# Patient Record
Sex: FEMALE | Race: OTHER | Employment: OTHER | URBAN - METROPOLITAN AREA
[De-identification: names, ages, dates, MRNs, and addresses within clinical notes are randomized per-mention and may not be internally consistent; named-entity substitution may affect disease eponyms.]

---

## 2019-07-09 NOTE — PATIENT DISCUSSION
The patient has a history of recurrent corneal erosion. This is a spontaneous corneal abrasion that recurrs due to previous corneal injury or an hereditary corneal epithelial dystrophy. It manifests itself by a sudden corneal defect, usually upon awakening and typically may heal during the day. I have recommended a preventative regimen of ocular lubricating gel, Celluvisc, to be used at bedtime indefinitely in addition to artificial tears to be used several times daily.

## 2022-04-08 ENCOUNTER — NEW PATIENT (OUTPATIENT)
Dept: URBAN - METROPOLITAN AREA CLINIC 39 | Facility: CLINIC | Age: 72
End: 2022-04-08

## 2022-04-08 DIAGNOSIS — E11.9: ICD-10-CM

## 2022-04-08 DIAGNOSIS — H25.813: ICD-10-CM

## 2022-04-08 DIAGNOSIS — Z79.4: ICD-10-CM

## 2022-04-08 PROCEDURE — 92015 DETERMINE REFRACTIVE STATE: CPT

## 2022-04-08 PROCEDURE — 92286 ANT SGM IMG I&R SPECLR MIC: CPT

## 2022-04-08 PROCEDURE — V2799PMN IMPRIMIS PRED-MOXI-NEPAF 5ML

## 2022-04-08 PROCEDURE — 92250 FUNDUS PHOTOGRAPHY W/I&R: CPT

## 2022-04-08 PROCEDURE — 92134 CPTRZ OPH DX IMG PST SGM RTA: CPT

## 2022-04-08 PROCEDURE — 92136TC INTERFEROMETRY - TECHNICAL COMPONENT

## 2022-04-08 PROCEDURE — 99204 OFFICE O/P NEW MOD 45 MIN: CPT

## 2022-04-08 PROCEDURE — 92025AV CORNEAL TOPOGRAPHY, AV

## 2022-04-08 ASSESSMENT — VISUAL ACUITY
OD_CC: 20/40
OD_SC: 20/200
OS_SC: J1
OD_SC: J1
OD_BAT: 20/100
OS_CC: 20/30-2
OS_BAT: 20/200
OS_SC: 20/100-1

## 2022-04-08 ASSESSMENT — TONOMETRY
OS_IOP_MMHG: 19
OD_IOP_MMHG: 18

## 2022-04-08 NOTE — PATIENT DISCUSSION
The types of intraocular lenses were reviewed with the patient along with a discussion of their various strengths and weaknesses. When ready for surgery OS, NEAR EYE- plan for -2.00 D, will send letter to Dr. Brent Stevens and welcome her recommendation for near target if different than -2.00 D.

## 2022-04-08 NOTE — PATIENT DISCUSSION
The types of intraocular lenses were reviewed with the patient along with a discussion of their various strengths and weaknesses. GOAL ELI OD - plan for monovision (she has done this in the past and liked it. Dr. Gutierrez Pod patient.

## 2022-05-05 ENCOUNTER — SURGERY/PROCEDURE (OUTPATIENT)
Dept: URBAN - METROPOLITAN AREA CLINIC 39 | Facility: CLINIC | Age: 72
End: 2022-05-05

## 2022-05-05 ENCOUNTER — ESTABLISHED PATIENT (OUTPATIENT)
Dept: URBAN - METROPOLITAN AREA SURGERY 14 | Facility: SURGERY | Age: 72
End: 2022-05-05

## 2022-05-05 DIAGNOSIS — H25.813: ICD-10-CM

## 2022-05-05 DIAGNOSIS — H25.811: ICD-10-CM

## 2022-05-05 DIAGNOSIS — H57.03: ICD-10-CM

## 2022-05-05 PROCEDURE — 99211HP H&P OFFICE/OUTPATIENT VISIT, EST

## 2022-05-05 PROCEDURE — 65772LRI LRI DURING CAT SX

## 2022-05-05 PROCEDURE — 66982CV COMPLEX CATARACT WITH IOL, CUSTOM VISION

## 2022-05-05 PROCEDURE — 66999LNSR LENSAR LASER FOR CAT SX

## 2022-05-05 NOTE — PATIENT DISCUSSION
The types of intraocular lenses were reviewed with the patient along with a discussion of their various strengths and weaknesses. When ready for surgery OS, NEAR EYE- plan for -2.00 D, will send letter to Dr. Analia Montoya and welcome her recommendation for near target if different than -2.00 D.

## 2022-05-05 NOTE — PATIENT DISCUSSION
The types of intraocular lenses were reviewed with the patient along with a discussion of their various strengths and weaknesses. GOAL ELI OD - plan for monovision (she has done this in the past and liked it. Dr. Angela Pap patient.

## 2022-05-05 NOTE — PATIENT DISCUSSION
The types of intraocular lenses were reviewed with the patient along with a discussion of their various strengths and weaknesses. When ready for surgery OS, NEAR EYE- plan for -2.00 D, will send letter to Dr. Nolvia Garcia and welcome her recommendation for near target if different than -2.00 D.

## 2022-05-05 NOTE — PATIENT DISCUSSION
Patient advised of the right to post-operative care by the surgeon. Patient is fully informed of, and agreed to, co-management with their primary optometric physician. Post-operative care by the surgeon is not medically necessary and co-management is clinically appropriate. Patient has received itemization of fees related to cataract surgery. Transfer of care letter completed for the patient. Transfer care of right eye to Dr. Brnet Stevens on 5.6.2022. Patient instructed to call immediately if any new distortion, blurring, decreased vision or eye pain.

## 2022-05-05 NOTE — PATIENT DISCUSSION
The types of intraocular lenses were reviewed with the patient along with a discussion of their various strengths and weaknesses. GOAL ELI OD - plan for monovision (she has done this in the past and liked it. Dr. Lazarus Sheng patient.

## 2022-05-12 ENCOUNTER — POST OP/EVAL OF SECOND EYE (OUTPATIENT)
Dept: URBAN - METROPOLITAN AREA SURGERY 14 | Facility: SURGERY | Age: 72
End: 2022-05-12

## 2022-05-12 ENCOUNTER — SURGERY/PROCEDURE (OUTPATIENT)
Dept: URBAN - METROPOLITAN AREA CLINIC 39 | Facility: CLINIC | Age: 72
End: 2022-05-12

## 2022-05-12 ENCOUNTER — ESTABLISHED PATIENT (OUTPATIENT)
Dept: URBAN - METROPOLITAN AREA SURGERY 14 | Facility: SURGERY | Age: 72
End: 2022-05-12

## 2022-05-12 DIAGNOSIS — H25.812: ICD-10-CM

## 2022-05-12 DIAGNOSIS — Z96.1: ICD-10-CM

## 2022-05-12 DIAGNOSIS — H25.811: ICD-10-CM

## 2022-05-12 DIAGNOSIS — H57.03: ICD-10-CM

## 2022-05-12 PROCEDURE — 66999LNSR LENSAR LASER FOR CAT SX

## 2022-05-12 PROCEDURE — 99211HP H&P OFFICE/OUTPATIENT VISIT, EST

## 2022-05-12 PROCEDURE — 99213 OFFICE O/P EST LOW 20 MIN: CPT

## 2022-05-12 PROCEDURE — 6698254CV COMPLEX CATARACT WITH IOL, SX ONLY, CUSTOM VISION

## 2022-05-12 ASSESSMENT — TONOMETRY: OD_IOP_MMHG: 18

## 2022-05-12 ASSESSMENT — VISUAL ACUITY: OD_SC: 20/20-1

## 2022-05-12 NOTE — PATIENT DISCUSSION
Patient advised of the right to post-operative care by the surgeon. Patient is fully informed of, and agreed to, co-management with their primary optometric physician. Post-operative care by the surgeon is not medically necessary and co-management is clinically appropriate. Patient has received itemization of fees related to cataract surgery. Transfer of care letter completed for the patient. Transfer care of right eye to Dr. Tavo Mendoza on 5.6.2022. Patient instructed to call immediately if any new distortion, blurring, decreased vision or eye pain.

## 2022-05-12 NOTE — PATIENT DISCUSSION
The types of intraocular lenses were reviewed with the patient along with a discussion of their various strengths and weaknesses. GOAL ELI OD - plan for monovision (she has done this in the past and liked it. Dr. Seth Willis patient.

## 2022-05-12 NOTE — PATIENT DISCUSSION
The types of intraocular lenses were reviewed with the patient along with a discussion of their various strengths and weaknesses. GOAL ELI OD - plan for monovision (she has done this in the past and liked it. Dr. Yunior Kemp patient.

## 2022-05-12 NOTE — PATIENT DISCUSSION
Patient advised of the right to post-operative care by the surgeon. Patient is fully informed of, and agreed to, co-management with their primary optometric physician. Post-operative care by the surgeon is not medically necessary and co-management is clinically appropriate. Patient has received itemization of fees related to cataract surgery. Transfer of care letter completed for the patient. Transfer care of right eye to Dr. Ziyad Maldonado on 5.6.2022. Patient instructed to call immediately if any new distortion, blurring, decreased vision or eye pain.

## 2022-05-12 NOTE — PATIENT DISCUSSION
The types of intraocular lenses were reviewed with the patient along with a discussion of their various strengths and weaknesses. When ready for surgery OS, NEAR EYE- plan for -2.00 D, will send letter to Dr. Petra Humphrey and welcome her recommendation for near target if different than -2.00 D.

## 2022-05-12 NOTE — PATIENT DISCUSSION
The types of intraocular lenses were reviewed with the patient along with a discussion of their various strengths and weaknesses. GOAL ELI OD - plan for monovision (she has done this in the past and liked it. Dr. Renata Parker patient.

## 2022-05-12 NOTE — PATIENT DISCUSSION
Patient advised of the right to post-operative care by the surgeon. Patient is fully informed of, and agreed to, co-management with their primary optometric physician. Post-operative care by the surgeon is not medically necessary and co-management is clinically appropriate. Patient has received itemization of fees related to cataract surgery. Transfer of care letter completed for the patient. Transfer care of right eye to Dr. Hannah Collado on 5.6.2022. Patient instructed to call immediately if any new distortion, blurring, decreased vision or eye pain.

## 2022-05-12 NOTE — PATIENT DISCUSSION
The types of intraocular lenses were reviewed with the patient along with a discussion of their various strengths and weaknesses. When ready for surgery OS, NEAR EYE- plan for -2.00 D, will send letter to Dr. Joyce Galeazzi and welcome her recommendation for near target if different than -2.00 D.